# Patient Record
Sex: FEMALE | URBAN - METROPOLITAN AREA
[De-identification: names, ages, dates, MRNs, and addresses within clinical notes are randomized per-mention and may not be internally consistent; named-entity substitution may affect disease eponyms.]

---
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
## 2023-07-01 ENCOUNTER — NON-APPOINTMENT (OUTPATIENT)
Age: 18
End: 2023-07-01

## 2023-07-20 ENCOUNTER — TRANSCRIPTION ENCOUNTER (OUTPATIENT)
Age: 18
End: 2023-07-20

## 2023-07-20 ENCOUNTER — APPOINTMENT (OUTPATIENT)
Dept: INTERNAL MEDICINE | Facility: CLINIC | Age: 18
End: 2023-07-20
Payer: MEDICAID

## 2023-07-20 VITALS
HEIGHT: 62 IN | SYSTOLIC BLOOD PRESSURE: 106 MMHG | WEIGHT: 117 LBS | DIASTOLIC BLOOD PRESSURE: 74 MMHG | OXYGEN SATURATION: 98 % | BODY MASS INDEX: 21.53 KG/M2 | HEART RATE: 90 BPM

## 2023-07-20 DIAGNOSIS — Z82.61 FAMILY HISTORY OF ARTHRITIS: ICD-10-CM

## 2023-07-20 DIAGNOSIS — R07.9 CHEST PAIN, UNSPECIFIED: ICD-10-CM

## 2023-07-20 DIAGNOSIS — R42 DIZZINESS AND GIDDINESS: ICD-10-CM

## 2023-07-20 DIAGNOSIS — Q24.9 CONGENITAL MALFORMATION OF HEART, UNSPECIFIED: ICD-10-CM

## 2023-07-20 DIAGNOSIS — R63.1 POLYDIPSIA: ICD-10-CM

## 2023-07-20 DIAGNOSIS — F41.1 GENERALIZED ANXIETY DISORDER: ICD-10-CM

## 2023-07-20 DIAGNOSIS — F42.2 MIXED OBSESSIONAL THOUGHTS AND ACTS: ICD-10-CM

## 2023-07-20 DIAGNOSIS — R53.83 OTHER FATIGUE: ICD-10-CM

## 2023-07-20 PROCEDURE — 99204 OFFICE O/P NEW MOD 45 MIN: CPT | Mod: 25

## 2023-07-20 NOTE — PLAN
[FreeTextEntry1] : \par Chronic non specific fatigue, dizziness and chest pain with  history of VAD?SAD?\par will check Echo, and lab\par Lab ( venipuncture ) done today at this office\par \par Neck pain MS pain, trial of flexeril 5mg at bedtime, check C spine XR,  and observe\par \par NIKKIE OCD \par behavioral health evaluation for therapy\par referral done\par \par RTC in 1 month follow up\par

## 2023-07-20 NOTE — PHYSICAL EXAM
[No Acute Distress] : no acute distress [Well Nourished] : well nourished [Well Developed] : well developed [Well-Appearing] : well-appearing [Normal Sclera/Conjunctiva] : normal sclera/conjunctiva [PERRL] : pupils equal round and reactive to light [EOMI] : extraocular movements intact [Normal Outer Ear/Nose] : the outer ears and nose were normal in appearance [No Lymphadenopathy] : no lymphadenopathy [Supple] : supple [Thyroid Normal, No Nodules] : the thyroid was normal and there were no nodules present [No Respiratory Distress] : no respiratory distress  [No Accessory Muscle Use] : no accessory muscle use [Clear to Auscultation] : lungs were clear to auscultation bilaterally [Normal Rate] : normal rate  [Regular Rhythm] : with a regular rhythm [Normal S1, S2] : normal S1 and S2 [No Murmur] : no murmur heard [No Edema] : there was no peripheral edema [No Extremity Clubbing/Cyanosis] : no extremity clubbing/cyanosis [Soft] : abdomen soft [Non Tender] : non-tender [Non-distended] : non-distended [No Masses] : no abdominal mass palpated [Normal Anterior Cervical Nodes] : no anterior cervical lymphadenopathy [No Joint Swelling] : no joint swelling [Grossly Normal Strength/Tone] : grossly normal strength/tone [Coordination Grossly Intact] : coordination grossly intact [Normal Gait] : normal gait [Normal Affect] : the affect was normal [Alert and Oriented x3] : oriented to person, place, and time [Normal Insight/Judgement] : insight and judgment were intact [No JVD] : no jugular venous distention [de-identified] : n

## 2023-07-20 NOTE — HEALTH RISK ASSESSMENT
[No] : In the past 12 months have you used drugs other than those required for medical reasons? No [No falls in past year] : Patient reported no falls in the past year [0] : 2) Feeling down, depressed, or hopeless: Not at all (0) [PHQ-2 Negative - No further assessment needed] : PHQ-2 Negative - No further assessment needed [Never] : Never [de-identified] : dancing [de-identified] : regular

## 2023-07-20 NOTE — REVIEW OF SYSTEMS
[Fatigue] : fatigue [Chest Pain] : chest pain [Dizziness] : dizziness [Anxiety] : anxiety [Negative] : Genitourinary [Fever] : no fever [Chills] : no chills [Recent Change In Weight] : ~T no recent weight change [Palpitations] : no palpitations [Leg Claudication] : no leg claudication [Lower Ext Edema] : no lower extremity edema [Paroxysmal Nocturnal Dyspnea] : no paroxysmal nocturnal dyspnea [Headache] : no headache [Fainting] : no fainting [Confusion] : no confusion [Unsteady Walking] : no ataxia [FreeTextEntry9] : chronic neck pain [de-identified] : OCD like behavior

## 2023-07-20 NOTE — HISTORY OF PRESENT ILLNESS
[FreeTextEntry8] : pt is here for new acute evaluation, physical exam done a few month ago, and no abnormal findings were told \par About 1 month ago pt started having HA from dehydration, drinking a lot of water from excessive thirst, so sent to urgent care and fingerstick for glucose, it was normal. HA gone, but pt feels more fatigued and dizzy lately for about 1 month.  \par pt also getting chest pains on anterior chest at random time, sharp, squeezing, comes and goes, lasting a few seconds, for years, but more often for the recent month with some SOB. No fever or N/V/D. NIKKIE for the past 2-3 years. No depression. OCD behaviors like checking doors many times, going to bathroom many times before sleeping.etc. Excessive unusual anxiety on certain incidents such as receiving unexpected package thinking it were a bomb etc, getting annoyed and angry towards others \par pt has chronic neck pain for years, especially on extension, no radiating. pt received PT which did not help. \par Allergy pollen cats. pt had some red spot on face with dinner a few times latey\par PSH teeth extraction\par PMH VSD/ASD? ( last evaluation with cardiology in 2017 )\par

## 2023-07-25 LAB
ALBUMIN SERPL ELPH-MCNC: 4.7 G/DL
ALP BLD-CCNC: 55 U/L
ALT SERPL-CCNC: 9 U/L
ANION GAP SERPL CALC-SCNC: 11 MMOL/L
AST SERPL-CCNC: 20 U/L
BILIRUB SERPL-MCNC: 0.4 MG/DL
BUN SERPL-MCNC: 8 MG/DL
CALCIUM SERPL-MCNC: 9.7 MG/DL
CHLORIDE SERPL-SCNC: 101 MMOL/L
CO2 SERPL-SCNC: 25 MMOL/L
CREAT SERPL-MCNC: 0.58 MG/DL
EGFR: 133 ML/MIN/1.73M2
ESTIMATED AVERAGE GLUCOSE: 108 MG/DL
GLUCOSE SERPL-MCNC: 91 MG/DL
HBA1C MFR BLD HPLC: 5.4 %
POTASSIUM SERPL-SCNC: 4.7 MMOL/L
PROT SERPL-MCNC: 7.7 G/DL
SODIUM SERPL-SCNC: 137 MMOL/L
TSH SERPL-ACNC: 4.54 UIU/ML

## 2023-08-22 ENCOUNTER — APPOINTMENT (OUTPATIENT)
Dept: INTERNAL MEDICINE | Facility: CLINIC | Age: 18
End: 2023-08-22
Payer: MEDICAID

## 2023-08-22 VITALS
WEIGHT: 116 LBS | HEIGHT: 62 IN | BODY MASS INDEX: 21.35 KG/M2 | HEART RATE: 89 BPM | SYSTOLIC BLOOD PRESSURE: 100 MMHG | OXYGEN SATURATION: 98 % | DIASTOLIC BLOOD PRESSURE: 66 MMHG

## 2023-08-22 DIAGNOSIS — J30.9 ALLERGIC RHINITIS, UNSPECIFIED: ICD-10-CM

## 2023-08-22 DIAGNOSIS — I07.1 RHEUMATIC TRICUSPID INSUFFICIENCY: ICD-10-CM

## 2023-08-22 DIAGNOSIS — R05.9 COUGH, UNSPECIFIED: ICD-10-CM

## 2023-08-22 PROCEDURE — 99214 OFFICE O/P EST MOD 30 MIN: CPT | Mod: 25

## 2023-08-22 RX ORDER — FLUTICASONE PROPIONATE 50 UG/1
50 SPRAY, METERED NASAL DAILY
Qty: 1 | Refills: 2 | Status: ACTIVE | COMMUNITY
Start: 2023-08-22 | End: 1900-01-01

## 2023-08-22 RX ORDER — ALBUTEROL SULFATE 90 UG/1
108 (90 BASE) INHALANT RESPIRATORY (INHALATION)
Qty: 1 | Refills: 1 | Status: ACTIVE | COMMUNITY
Start: 2023-08-22 | End: 1900-01-01

## 2023-08-22 NOTE — HISTORY OF PRESENT ILLNESS
[FreeTextEntry1] : follow up for neck pain, and cough, echo result and subclinical hypothyroidism [de-identified] : pt started coughing 1 week ago, mainly at night due to dry itchy throat, w/ no fever, sore throat or otalgia, or SOB or N/V/D. This happened last month as well. Covid test was negative. The cough is almost gone. pt feels like she was wheezing at that time pt has no history of asthma. Pt had ENT exam done which was normal in the past. pt feels stuffy on her nose all the time. pt also has runny nose and persistently blow nose.  HA gone. Neck pain is better with flexeril but comes back during daytime. Neck XR and echo result discussed. Echo showed mod TR.  pt has not been given appointment to see mental health doctor yet ( missed the call, and no answer when returned ) pt with chronic fatigue.

## 2023-08-22 NOTE — PLAN
-- Message is from the [facility]--      Patient is requesting a medication refill - medication is on active list    Was Medication Pended? Yes.    Rx Name and Dose:  Hydrocodone-acetaminophen 10-325mg    Duration: 30 days    Pharmacy  [facility] [id number] - [address] [facility]    Patient confirmed the above pharmacy as correct?  Yes    Caller Information       Type Contact Phone    07/09/2019 08:24 AM Phone (Incoming) Yane Mtaa (Self) [phone] (M)          Alternative phone number: n/a    Turnaround time given to caller:   \"This message will be sent to [state Provider's name]. The clinical team will fulfill your request as soon as they review your message.\"  
Left voice message informed too early for refill and to call back on 07/11/2019.  
She is too early for her refill.   Has to wait a full 30 days.   Refill due Thursday 7/11.  She can call back on 7/11 for the refill.  
[FreeTextEntry1] :  Nocturnal coughing recurrent likely due to allergic rhinitis /possibly asthmatic cough will try albuterol as needed when excessive coughing  Claritin 10mg daily and flonase nasal spray in the evening and  observe closely clinically and RTC if persistent or worsening S/S Chronic neck pain MS pain, S/P neck XR, cont' flexeril as needed, Aleve one tab with food daily as needed Acupuncture and physical therapy can be done as well  Subclinical hypothyroidism, chronic fatigue, repeat TFT follow up in 4-6 month Mod TR, observe clinically, and periodic echo every 2-3 years

## 2023-08-22 NOTE — REVIEW OF SYSTEMS
[Fatigue] : fatigue [Shortness Of Breath] : no shortness of breath [Cough] : cough [Joint Pain] : no joint pain [Joint Stiffness] : no joint stiffness [Joint Swelling] : no joint swelling [Muscle Weakness] : no muscle weakness [Anxiety] : anxiety [Negative] : Neurological [FreeTextEntry9] : chronic neck pain, localized, no radiation to arms [de-identified] : OCD like behavior

## 2023-08-28 ENCOUNTER — TRANSCRIPTION ENCOUNTER (OUTPATIENT)
Age: 18
End: 2023-08-28

## 2023-09-13 ENCOUNTER — TRANSCRIPTION ENCOUNTER (OUTPATIENT)
Age: 18
End: 2023-09-13

## 2023-09-26 ENCOUNTER — TRANSCRIPTION ENCOUNTER (OUTPATIENT)
Age: 18
End: 2023-09-26

## 2023-12-16 ENCOUNTER — APPOINTMENT (OUTPATIENT)
Dept: INTERNAL MEDICINE | Facility: CLINIC | Age: 18
End: 2023-12-16
Payer: MEDICAID

## 2023-12-16 ENCOUNTER — LABORATORY RESULT (OUTPATIENT)
Age: 18
End: 2023-12-16

## 2023-12-16 VITALS
HEART RATE: 75 BPM | BODY MASS INDEX: 21.35 KG/M2 | OXYGEN SATURATION: 90 % | DIASTOLIC BLOOD PRESSURE: 73 MMHG | HEIGHT: 62 IN | WEIGHT: 116 LBS | SYSTOLIC BLOOD PRESSURE: 115 MMHG

## 2023-12-16 DIAGNOSIS — Z00.00 ENCOUNTER FOR GENERAL ADULT MEDICAL EXAMINATION W/OUT ABNORMAL FINDINGS: ICD-10-CM

## 2023-12-16 DIAGNOSIS — E03.8 OTHER SPECIFIED HYPOTHYROIDISM: ICD-10-CM

## 2023-12-16 DIAGNOSIS — M54.2 CERVICALGIA: ICD-10-CM

## 2023-12-16 PROCEDURE — 99213 OFFICE O/P EST LOW 20 MIN: CPT | Mod: 25

## 2023-12-16 RX ORDER — CYCLOBENZAPRINE HYDROCHLORIDE 5 MG/1
5 TABLET, FILM COATED ORAL
Qty: 30 | Refills: 1 | Status: ACTIVE | COMMUNITY
Start: 2023-07-20 | End: 1900-01-01

## 2023-12-16 RX ORDER — DICLOFENAC SODIUM 1% 10 MG/G
1 GEL TOPICAL
Qty: 1 | Refills: 1 | Status: ACTIVE | COMMUNITY
Start: 2023-12-16 | End: 1900-01-01

## 2023-12-16 NOTE — PHYSICAL EXAM
[No Acute Distress] : no acute distress [Well-Appearing] : well-appearing [Supple] : supple [Thyroid Normal, No Nodules] : the thyroid was normal and there were no nodules present [No Respiratory Distress] : no respiratory distress  [No Accessory Muscle Use] : no accessory muscle use [Clear to Auscultation] : lungs were clear to auscultation bilaterally [Normal Rate] : normal rate  [Regular Rhythm] : with a regular rhythm [Normal S1, S2] : normal S1 and S2 [No Edema] : there was no peripheral edema [No Extremity Clubbing/Cyanosis] : no extremity clubbing/cyanosis [Soft] : abdomen soft [Non-distended] : non-distended [Grossly Normal Strength/Tone] : grossly normal strength/tone [Normal Gait] : normal gait [Normal Affect] : the affect was normal [Alert and Oriented x3] : oriented to person, place, and time

## 2023-12-16 NOTE — HISTORY OF PRESENT ILLNESS
[FreeTextEntry1] : Neck pain and subclinical hypothyroidism [de-identified] : pt has been doing well without any acute illness, but still has neck pain and stiffness for which she takes flexeril which helps somewhat and needs refills. It also helps with sleep. pt has been seeing psychotherapy and to see a different therapist soon

## 2023-12-16 NOTE — REVIEW OF SYSTEMS
[Joint Pain] : no joint pain [Joint Swelling] : no joint swelling [Muscle Weakness] : no muscle weakness [Muscle Pain] : muscle pain [Back Pain] : no back pain [Negative] : Neurological [FreeTextEntry9] : neck pain and stiffness chronic, no radiation or sensory changes /weakness of arms

## 2023-12-16 NOTE — PLAN
[FreeTextEntry1] :  chronic neck pain, will try diclofenac gel as needed and continue flexeril 5mg at bedtime as needed and observe  observe closely clinically and RTC if persistent or worsening S/S subclinical hypothyroidism, repeat TFT Lab ( venipuncture ) done today at this office RTC CPE

## 2023-12-19 LAB
CHOLEST SERPL-MCNC: 161 MG/DL
HDLC SERPL-MCNC: 65 MG/DL
LDLC SERPL CALC-MCNC: 81 MG/DL
NONHDLC SERPL-MCNC: 97 MG/DL
T4 FREE SERPL-MCNC: 1.2 NG/DL
TRIGL SERPL-MCNC: 85 MG/DL
TSH SERPL-ACNC: 2.17 UIU/ML

## 2024-01-16 RX ORDER — CETIRIZINE HYDROCHLORIDE 10 MG/1
10 TABLET, FILM COATED ORAL
Qty: 30 | Refills: 3 | Status: ACTIVE | COMMUNITY
Start: 2024-01-16 | End: 1900-01-01